# Patient Record
Sex: FEMALE | Race: WHITE | Employment: UNEMPLOYED | URBAN - METROPOLITAN AREA
[De-identification: names, ages, dates, MRNs, and addresses within clinical notes are randomized per-mention and may not be internally consistent; named-entity substitution may affect disease eponyms.]

---

## 2021-08-06 LAB
ANTIBODY SCREEN, EXTERNAL: NEGATIVE
CHLAMYDIA, EXTERNAL: NEGATIVE
HBSAG, EXTERNAL: NORMAL
HCT, EXTERNAL: 39.6
HEPATITIS C AB,   EXT: NORMAL
HGB, EXTERNAL: 12.6
HIV, EXTERNAL: NORMAL
N. GONORRHEA, EXTERNAL: NEGATIVE
PLATELET CNT,   EXTERNAL: 219
RUBELLA, EXTERNAL: NORMAL
T. PALLIDUM, EXTERNAL: NEGATIVE
TYPE, ABO & RH, EXTERNAL: NORMAL

## 2022-01-24 LAB — GRBS, EXTERNAL: NEGATIVE

## 2022-02-08 ENCOUNTER — TRANSCRIBE ORDER (OUTPATIENT)
Dept: REGISTRATION | Age: 32
End: 2022-02-08

## 2022-02-08 ENCOUNTER — HOSPITAL ENCOUNTER (OUTPATIENT)
Dept: PREADMISSION TESTING | Age: 32
Discharge: HOME | End: 2022-02-08
Attending: OBSTETRICS & GYNECOLOGY

## 2022-02-08 DIAGNOSIS — U07.1 COVID-19: ICD-10-CM

## 2022-02-08 DIAGNOSIS — U07.1 COVID-19: Primary | ICD-10-CM

## 2022-02-08 PROCEDURE — U0005 INFEC AGEN DETEC AMPLI PROBE: HCPCS

## 2022-02-09 LAB
SARS-COV-2, XPLCVT: NOT DETECTED
SOURCE, COVRS: NORMAL

## 2022-02-14 ENCOUNTER — HOSPITAL ENCOUNTER (INPATIENT)
Age: 32
LOS: 2 days | Discharge: HOME | End: 2022-02-16
Attending: OBSTETRICS & GYNECOLOGY | Admitting: OBSTETRICS & GYNECOLOGY
Payer: COMMERCIAL

## 2022-02-14 ENCOUNTER — ANESTHESIA (OUTPATIENT)
Dept: LABOR AND DELIVERY | Age: 32
End: 2022-02-14
Payer: COMMERCIAL

## 2022-02-14 ENCOUNTER — ANESTHESIA EVENT (OUTPATIENT)
Dept: LABOR AND DELIVERY | Age: 32
End: 2022-02-14
Payer: COMMERCIAL

## 2022-02-14 PROBLEM — Z3A.39 39 WEEKS GESTATION OF PREGNANCY: Status: ACTIVE | Noted: 2022-02-14

## 2022-02-14 LAB
A1 MICROGLOB PLACENTAL VAG QL: POSITIVE
CONTROL LINE PRESENT?: NORMAL
ERYTHROCYTE [DISTWIDTH] IN BLOOD BY AUTOMATED COUNT: 11.8 % (ref 11.5–14.5)
EXPIRATION DATE: NORMAL
HCT VFR BLD AUTO: 38.1 % (ref 35–47)
HGB BLD-MCNC: 12.6 G/DL (ref 11.5–16)
INTERNAL NEGATIVE CONTROL: NORMAL
KIT LOT NO.: NORMAL
MCH RBC QN AUTO: 31.7 PG (ref 26–34)
MCHC RBC AUTO-ENTMCNC: 33.1 G/DL (ref 30–36.5)
MCV RBC AUTO: 95.7 FL (ref 80–99)
NRBC # BLD: 0 K/UL (ref 0–0.01)
NRBC BLD-RTO: 0 PER 100 WBC
PLATELET # BLD AUTO: 244 K/UL (ref 150–400)
PMV BLD AUTO: 10.3 FL (ref 8.9–12.9)
RBC # BLD AUTO: 3.98 M/UL (ref 3.8–5.2)
WBC # BLD AUTO: 10.5 K/UL (ref 3.6–11)

## 2022-02-14 PROCEDURE — 74011250636 HC RX REV CODE- 250/636: Performed by: OBSTETRICS & GYNECOLOGY

## 2022-02-14 PROCEDURE — 84112 EVAL AMNIOTIC FLUID PROTEIN: CPT | Performed by: OBSTETRICS & GYNECOLOGY

## 2022-02-14 PROCEDURE — 74011000250 HC RX REV CODE- 250: Performed by: ANESTHESIOLOGY

## 2022-02-14 PROCEDURE — 74011250637 HC RX REV CODE- 250/637: Performed by: OBSTETRICS & GYNECOLOGY

## 2022-02-14 PROCEDURE — 0KQM0ZZ REPAIR PERINEUM MUSCLE, OPEN APPROACH: ICD-10-PCS | Performed by: OBSTETRICS & GYNECOLOGY

## 2022-02-14 PROCEDURE — 74011250636 HC RX REV CODE- 250/636: Performed by: MIDWIFE

## 2022-02-14 PROCEDURE — 85027 COMPLETE CBC AUTOMATED: CPT

## 2022-02-14 PROCEDURE — 36415 COLL VENOUS BLD VENIPUNCTURE: CPT

## 2022-02-14 PROCEDURE — 75410000003 HC RECOV DEL/VAG/CSECN EA 0.5 HR

## 2022-02-14 PROCEDURE — 4A1HXCZ MONITORING OF PRODUCTS OF CONCEPTION, CARDIAC RATE, EXTERNAL APPROACH: ICD-10-PCS | Performed by: OBSTETRICS & GYNECOLOGY

## 2022-02-14 PROCEDURE — 65410000002 HC RM PRIVATE OB

## 2022-02-14 PROCEDURE — 76060000078 HC EPIDURAL ANESTHESIA

## 2022-02-14 PROCEDURE — 75410000002 HC LABOR FEE PER 1 HR

## 2022-02-14 PROCEDURE — 99285 EMERGENCY DEPT VISIT HI MDM: CPT

## 2022-02-14 PROCEDURE — 75410000000 HC DELIVERY VAGINAL/SINGLE

## 2022-02-14 RX ORDER — SODIUM CHLORIDE 0.9 % (FLUSH) 0.9 %
5-40 SYRINGE (ML) INJECTION AS NEEDED
Status: DISCONTINUED | OUTPATIENT
Start: 2022-02-14 | End: 2022-02-16 | Stop reason: HOSPADM

## 2022-02-14 RX ORDER — OXYTOCIN/RINGER'S LACTATE 30/500 ML
10 PLASTIC BAG, INJECTION (ML) INTRAVENOUS AS NEEDED
Status: DISCONTINUED | OUTPATIENT
Start: 2022-02-14 | End: 2022-02-16 | Stop reason: HOSPADM

## 2022-02-14 RX ORDER — AMMONIA 15 % (W/V)
1 AMPUL (EA) INHALATION AS NEEDED
Status: DISCONTINUED | OUTPATIENT
Start: 2022-02-14 | End: 2022-02-16 | Stop reason: HOSPADM

## 2022-02-14 RX ORDER — ZINC GLUCONATE 10 MG
LOZENGE ORAL
COMMUNITY

## 2022-02-14 RX ORDER — FENTANYL/BUPIVACAINE/NS/PF 2-1250MCG
1-16 PREFILLED PUMP RESERVOIR EPIDURAL CONTINUOUS
Status: DISCONTINUED | OUTPATIENT
Start: 2022-02-14 | End: 2022-02-14 | Stop reason: HOSPADM

## 2022-02-14 RX ORDER — ONDANSETRON 2 MG/ML
4 INJECTION INTRAMUSCULAR; INTRAVENOUS
Status: DISCONTINUED | OUTPATIENT
Start: 2022-02-14 | End: 2022-02-16 | Stop reason: HOSPADM

## 2022-02-14 RX ORDER — EPHEDRINE SULFATE/0.9% NACL/PF 50 MG/5 ML
SYRINGE (ML) INTRAVENOUS
Status: DISCONTINUED
Start: 2022-02-14 | End: 2022-02-14 | Stop reason: WASHOUT

## 2022-02-14 RX ORDER — BUPIVACAINE HYDROCHLORIDE 5 MG/ML
30 INJECTION, SOLUTION EPIDURAL; INTRACAUDAL AS NEEDED
Status: DISCONTINUED | OUTPATIENT
Start: 2022-02-14 | End: 2022-02-14 | Stop reason: HOSPADM

## 2022-02-14 RX ORDER — OXYCODONE AND ACETAMINOPHEN 5; 325 MG/1; MG/1
1 TABLET ORAL
Status: DISCONTINUED | OUTPATIENT
Start: 2022-02-14 | End: 2022-02-16 | Stop reason: HOSPADM

## 2022-02-14 RX ORDER — IBUPROFEN 400 MG/1
800 TABLET ORAL EVERY 8 HOURS
Status: DISCONTINUED | OUTPATIENT
Start: 2022-02-14 | End: 2022-02-16 | Stop reason: HOSPADM

## 2022-02-14 RX ORDER — HYDROCORTISONE 1 %
CREAM (GRAM) TOPICAL AS NEEDED
Status: DISCONTINUED | OUTPATIENT
Start: 2022-02-14 | End: 2022-02-16 | Stop reason: HOSPADM

## 2022-02-14 RX ORDER — SODIUM CHLORIDE 0.9 % (FLUSH) 0.9 %
5-40 SYRINGE (ML) INJECTION EVERY 8 HOURS
Status: DISCONTINUED | OUTPATIENT
Start: 2022-02-14 | End: 2022-02-16 | Stop reason: HOSPADM

## 2022-02-14 RX ORDER — LANOLIN ALCOHOL/MO/W.PET/CERES
3 CREAM (GRAM) TOPICAL
Status: DISCONTINUED | OUTPATIENT
Start: 2022-02-14 | End: 2022-02-16 | Stop reason: HOSPADM

## 2022-02-14 RX ORDER — SODIUM CHLORIDE, SODIUM LACTATE, POTASSIUM CHLORIDE, CALCIUM CHLORIDE 600; 310; 30; 20 MG/100ML; MG/100ML; MG/100ML; MG/100ML
125 INJECTION, SOLUTION INTRAVENOUS CONTINUOUS
Status: DISCONTINUED | OUTPATIENT
Start: 2022-02-14 | End: 2022-02-16 | Stop reason: HOSPADM

## 2022-02-14 RX ORDER — EPHEDRINE SULFATE/0.9% NACL/PF 50 MG/5 ML
12.5 SYRINGE (ML) INTRAVENOUS
Status: DISCONTINUED | OUTPATIENT
Start: 2022-02-14 | End: 2022-02-14 | Stop reason: HOSPADM

## 2022-02-14 RX ORDER — VITAMIN A ACETATE, BETA CAROTENE, ASCORBIC ACID, CHOLECALCIFEROL, .ALPHA.-TOCOPHEROL ACETATE, DL-, THIAMINE MONONITRATE, RIBOFLAVIN, NIACINAMIDE, PYRIDOXINE HYDROCHLORIDE, FOLIC ACID, CYANOCOBALAMIN, CALCIUM CARBONATE, FERROUS FUMARATE, ZINC OXIDE, CUPRIC OXIDE 3080; 12; 120; 400; 1; 1.84; 3; 20; 22; 920; 25; 200; 27; 10; 2 [IU]/1; UG/1; MG/1; [IU]/1; MG/1; MG/1; MG/1; MG/1; MG/1; [IU]/1; MG/1; MG/1; MG/1; MG/1; MG/1
1 TABLET, FILM COATED ORAL DAILY
COMMUNITY

## 2022-02-14 RX ORDER — BUPIVACAINE HYDROCHLORIDE 5 MG/ML
INJECTION, SOLUTION EPIDURAL; INTRACAUDAL
Status: COMPLETED | OUTPATIENT
Start: 2022-02-14 | End: 2022-02-14

## 2022-02-14 RX ORDER — SIMETHICONE 80 MG
80 TABLET,CHEWABLE ORAL
Status: DISCONTINUED | OUTPATIENT
Start: 2022-02-14 | End: 2022-02-16 | Stop reason: HOSPADM

## 2022-02-14 RX ORDER — BUTORPHANOL TARTRATE 1 MG/ML
2 INJECTION INTRAMUSCULAR; INTRAVENOUS
Status: DISCONTINUED | OUTPATIENT
Start: 2022-02-14 | End: 2022-02-16 | Stop reason: HOSPADM

## 2022-02-14 RX ORDER — OXYTOCIN/RINGER'S LACTATE 30/500 ML
87.3 PLASTIC BAG, INJECTION (ML) INTRAVENOUS AS NEEDED
Status: DISCONTINUED | OUTPATIENT
Start: 2022-02-14 | End: 2022-02-16 | Stop reason: HOSPADM

## 2022-02-14 RX ORDER — NALOXONE HYDROCHLORIDE 0.4 MG/ML
0.4 INJECTION, SOLUTION INTRAMUSCULAR; INTRAVENOUS; SUBCUTANEOUS AS NEEDED
Status: DISCONTINUED | OUTPATIENT
Start: 2022-02-14 | End: 2022-02-14 | Stop reason: HOSPADM

## 2022-02-14 RX ORDER — TERBUTALINE SULFATE 1 MG/ML
0.25 INJECTION SUBCUTANEOUS AS NEEDED
Status: DISCONTINUED | OUTPATIENT
Start: 2022-02-14 | End: 2022-02-14 | Stop reason: HOSPADM

## 2022-02-14 RX ORDER — DIPHENHYDRAMINE HYDROCHLORIDE 50 MG/ML
12.5 INJECTION, SOLUTION INTRAMUSCULAR; INTRAVENOUS
Status: DISCONTINUED | OUTPATIENT
Start: 2022-02-14 | End: 2022-02-16 | Stop reason: HOSPADM

## 2022-02-14 RX ORDER — OXYTOCIN/RINGER'S LACTATE 30/500 ML
10 PLASTIC BAG, INJECTION (ML) INTRAVENOUS AS NEEDED
Status: COMPLETED | OUTPATIENT
Start: 2022-02-14 | End: 2022-02-14

## 2022-02-14 RX ORDER — DOCUSATE SODIUM 100 MG/1
100 CAPSULE, LIQUID FILLED ORAL
Status: DISCONTINUED | OUTPATIENT
Start: 2022-02-14 | End: 2022-02-15

## 2022-02-14 RX ORDER — HYDROCORTISONE ACETATE PRAMOXINE HCL 2.5; 1 G/100G; G/100G
CREAM TOPICAL AS NEEDED
Status: DISCONTINUED | OUTPATIENT
Start: 2022-02-14 | End: 2022-02-16 | Stop reason: HOSPADM

## 2022-02-14 RX ADMIN — Medication 12.5 MG: at 04:34

## 2022-02-14 RX ADMIN — OXYTOCIN 10000 MILLI-UNITS: 10 INJECTION INTRAVENOUS at 17:08

## 2022-02-14 RX ADMIN — BUPIVACAINE HYDROCHLORIDE 30 MG: 5 INJECTION, SOLUTION EPIDURAL; INTRACAUDAL; PERINEURAL at 08:23

## 2022-02-14 RX ADMIN — IBUPROFEN 800 MG: 400 TABLET, FILM COATED ORAL at 19:04

## 2022-02-14 RX ADMIN — SODIUM CHLORIDE, POTASSIUM CHLORIDE, SODIUM LACTATE AND CALCIUM CHLORIDE 500 ML: 600; 310; 30; 20 INJECTION, SOLUTION INTRAVENOUS at 02:39

## 2022-02-14 RX ADMIN — Medication 10 ML/HR: at 08:56

## 2022-02-14 RX ADMIN — BUTORPHANOL TARTRATE 2 MG: 1 INJECTION, SOLUTION INTRAMUSCULAR; INTRAVENOUS at 04:27

## 2022-02-14 RX ADMIN — SODIUM CHLORIDE, POTASSIUM CHLORIDE, SODIUM LACTATE AND CALCIUM CHLORIDE 125 ML/HR: 600; 310; 30; 20 INJECTION, SOLUTION INTRAVENOUS at 13:13

## 2022-02-14 NOTE — H&P
G1 @ 39+1 admitted to L&D with PROM. Was laying down for bed and felt a large gush of clear fluid with continued leaking. She may be having some cramping, but no contractions. Denies bleeding, and reports wnl FM.      PNC with Dr Jose Alejandro Pete unremarkable. Hx of kidney stones, and brother had DVT while on steroids but pt with no known thrombophilias.              Past Medical History   History reviewed. No pertinent past medical history.        Past Surgical History         Past Surgical History:   Procedure Laterality Date    HX WISDOM TEETH EXTRACTION                 History reviewed. No pertinent family history.     Social History      Tobacco Use    Smoking status: Never Smoker    Smokeless tobacco: Never Used   Vaping Use    Vaping Use: Never used   Substance and Sexual Activity    Alcohol use: Not Currently    Drug use: Never                  ALLERGIES: Patient has no known allergies.     Review of Systems   Constitutional: Negative. Negative for chills and fever. HENT: Negative. Eyes: Negative. Respiratory: Negative. Cardiovascular: Negative. Gastrointestinal: Negative. Negative for constipation, diarrhea, nausea and vomiting. Endocrine: Negative. Genitourinary: Negative. Negative for dysuria, hematuria and vaginal bleeding. Skin: Negative. Allergic/Immunologic: Negative. Neurological: Negative. Hematological: Negative. Psychiatric/Behavioral: Negative.              Vitals:     02/14/22 0037   BP: 124/78   Pulse: 64   Resp: 16   Temp: 98.4 °F (36.9 °C)   SpO2: 99%             Physical Exam  Constitutional:       Appearance: Normal appearance. HENT:      Head: Normocephalic. Cardiovascular:      Rate and Rhythm: Normal rate. Pulmonary:      Effort: Pulmonary effort is normal.   Abdominal:      Palpations: Abdomen is soft. Tenderness: There is no abdominal tenderness.    Genitourinary:     Comments: ULISES deferred, 2/70/-2 in the office, vertex by leopolds  Musculoskeletal:         General: Normal range of motion. Cervical back: Normal range of motion. Skin:     General: Skin is warm and dry. Neurological:      General: No focal deficit present. Mental Status: She is alert and oriented to person, place, and time. Psychiatric:         Mood and Affect: Mood normal.         Behavior: Behavior normal.       EFM: 130, moderate variability, accels present, no decels                     G1 @ 39w with PROM at x 2 hours, clear  1. Admit to L&D  2. Consider augmentation at 0600 if not in labor  3. GBS neg  4.  Covid neg

## 2022-02-14 NOTE — ANESTHESIA PROCEDURE NOTES
Epidural Block    Patient location during procedure: OB  Start time: 2/14/2022 8:23 AM  End time: 2/14/2022 8:34 AM  Reason for block: labor epidural  Staffing  Performed: attending   Anesthesiologist: Cisco Low MD  Preanesthetic Checklist  Completed: patient identified, IV checked, site marked, risks and benefits discussed, surgical consent, monitors and equipment checked, pre-op evaluation and timeout performed  Block Placement  Patient position: sitting  Prep: ChloraPrep  Sterility prep: cap, drape, gloves and mask  Sedation level: no sedation  Patient monitoring: continuous pulse oximetry and heart rate  Approach: midline  Location: lumbar  Lumbar location: L4-L5  Epidural  Loss of resistance technique: saline  Guidance: landmark technique  Needle  Needle type: Tuohy   Needle gauge: 17 G  Needle length: 9 cm  Needle insertion depth: 7 cm  Catheter type: end hole  Catheter size: 19 G  Catheter at skin depth: 12 cm  Catheter securement method: clear occlusive dressing, liquid medical adhesive and surgical tape  Medications Administered  Bupivacaine (PF) (MARCAINE) 0.5 % (5 mg/mL) Epidural, 30 mg  Assessment  Sensory level: T6  Block outcome: pain improved  Number of attempts: 1  Procedure assessment: patient tolerated procedure well with no immediate complications

## 2022-02-14 NOTE — PROGRESS NOTES
Labor Progress Note  Patient seen, fetal heart rate and contraction pattern evaluated. Comfortable w epidural    VSS, pitocin @ NA  Fetal Heart Rate: moderate variability  Accelerations: yes  Decelerations: none  Uterine Activity: q3min  Cervical Exam: 6/90/-1      Assessment/Plan:  Reassuring fetal status. Will continue current management.

## 2022-02-14 NOTE — PROGRESS NOTES
Labor Progress Note  Patient seen, fetal heart rate and contraction pattern evaluated. VSS, pitocin @ NA  Fetal Heart Rate: moderate variability  Accelerations: yes  Decelerations: variable  Uterine Activity: Irregular    Assessment/Plan:  Reassuring fetal status. Patient pushing w excellent effort.

## 2022-02-14 NOTE — PROGRESS NOTES
Labor Progress Note  Patient seen, fetal heart rate and contraction pattern evaluated. Comfortable w epidural.    VSS, pitocin @ NA  Fetal Heart Rate: moderate variability  Accelerations: yes  Decelerations: variable  Uterine Activity: Irregular  Cervical Exam: FD/0      Assessment/Plan:  Reassuring fetal status. Will continue current management.   Baby asynclitic, will do position changes, labor down, then start pushing

## 2022-02-14 NOTE — ED PROVIDER NOTES
G1 @ 39+1 arrives to [facility] with c/o ROM. Was laying down for bed and felt a large gush of clear fluid with continued leaking. She may be having some cramping, but no contractions. Denies bleeding, and reports wnl FM. PNC with Dr David Crook unremarkable. Hx of kidney stones, and brother had DVT while on steroids but pt with no known thrombophilias. History reviewed. No pertinent past medical history. Past Surgical History:   Procedure Laterality Date    HX WISDOM TEETH EXTRACTION           History reviewed. No pertinent family history. Social History     Socioeconomic History    Marital status: UNKNOWN     Spouse name: Not on file    Number of children: Not on file    Years of education: Not on file    Highest education level: Not on file   Occupational History    Not on file   Tobacco Use    Smoking status: Never Smoker    Smokeless tobacco: Never Used   Vaping Use    Vaping Use: Never used   Substance and Sexual Activity    Alcohol use: Not Currently    Drug use: Never    Sexual activity: Not on file   Other Topics Concern     Service No    Blood Transfusions No    Caffeine Concern No    Occupational Exposure No    Hobby Hazards No    Sleep Concern No    Stress Concern No    Weight Concern No    Special Diet No    Back Care No    Exercise No    Bike Helmet No    Seat Belt No    Self-Exams No   Social History Narrative    Not on file     Social Determinants of Health     Financial Resource Strain:     Difficulty of Paying Living Expenses: Not on file   Food Insecurity:     Worried About Running Out of Food in the Last Year: Not on file    Gregg of Food in the Last Year: Not on file   Transportation Needs:     Lack of Transportation (Medical): Not on file    Lack of Transportation (Non-Medical):  Not on file   Physical Activity:     Days of Exercise per Week: Not on file    Minutes of Exercise per Session: Not on file   Stress:     Feeling of Stress : Not on file   Social Connections:     Frequency of Communication with Friends and Family: Not on file    Frequency of Social Gatherings with Friends and Family: Not on file    Attends [employer] Services: Not on file    Active Member of Clubs or Organizations: Not on file    Attends Club or Organization Meetings: Not on file    Marital Status: Not on file   Intimate Partner Violence:     Fear of Current or Ex-Partner: Not on file    Emotionally Abused: Not on file    Physically Abused: Not on file    Sexually Abused: Not on file   Housing Stability:     Unable to Pay for Housing in the Last Year: Not on file    Number of [place] in the Last Year: Not on file    Unstable Housing in the Last Year: Not on file         ALLERGIES: Patient has no known allergies. Review of Systems   Constitutional: Negative. Negative for chills and fever. HENT: Negative. Eyes: Negative. Respiratory: Negative. Cardiovascular: Negative. Gastrointestinal: Negative. Negative for constipation, diarrhea, nausea and vomiting. Endocrine: Negative. Genitourinary: Negative. Negative for dysuria, hematuria and vaginal bleeding. Skin: Negative. Allergic/Immunologic: Negative. Neurological: Negative. Hematological: Negative. Psychiatric/Behavioral: Negative. Vitals:    02/14/22 0037   BP: 124/78   Pulse: 64   Resp: 16   Temp: 98.4 °F (36.9 °C)   SpO2: 99%            Physical Exam  Constitutional:       Appearance: Normal appearance. HENT:      Head: Normocephalic. Cardiovascular:      Rate and Rhythm: Normal rate. Pulmonary:      Effort: Pulmonary effort is normal.   Abdominal:      Palpations: Abdomen is soft. Tenderness: There is no abdominal tenderness. Genitourinary:     Comments: ULISES deferred, 2/70/-2 in the office, vertex by leopolds  Musculoskeletal:         General: Normal range of motion. Cervical back: Normal range of motion. Skin:     General: Skin is warm and dry. Neurological:      General: No focal deficit present. Mental Status: She is alert and oriented to person, place, and time. Psychiatric:         Mood and Affect: Mood normal.         Behavior: Behavior normal.      EFM: 130, moderate variability, accels present, no decels    MDM  Number of Diagnoses or Management Options  Risk of Complications, Morbidity, and/or Mortality  Presenting problems: moderate  Diagnostic procedures: moderate  Management options: moderate    Critical Care  Total time providing critical care: 30-74 minutes    Patient Progress  Patient progress: stable    ED Course as of 02/14/22 0055   Mon Feb 14, 2022   0046 G1 @ 39w with PROM at 2245  1. Admit to L&D  2. Consider augmentation at 0600 if not in labor  3. GBS neg  4.  Covid neg [EF]      ED Course User Index  [EF] Kaity Brown CNM       Procedures

## 2022-02-14 NOTE — L&D DELIVERY NOTE
Delivery Summary  Patient: Ramiro Wolff             Circumcision:   NA-female  Additional Delivery Comments - pt pushed to deliver head OA, no nuchal cord, shoulders and rest of body followed quickly and without difficulty. Baby to mom's chest, short cord, delayed clamping, then cut by patient. Placenta spont, intact, 3VC. Small 2nd degree lac repaired w 3-0 vicryl in standard fashion.       Information for the patient's :  Everardo Leigh [[id number]]     Delivery Type: Vaginal, Spontaneous   Delivery Date: 2022   Delivery Time: 5:04 PM     Birth Weight:       Sex:  female  Delivery Clinician:  Leela Jean-Baptiste   Gestational Age: 36w3d    Presentation: Vertex   Position:    Occiput  Anterior     Apgars were   and       Resuscitation Method: Suctioning-bulb; Tactile Stimulation     Meconium Stained: None    Living Status:         Placenta Date/Time: 2022  5:08 PM   Placenta Removal: Spontaneous   Placenta Appearance: Normal    Cord Information: 3 Vessels    Cord Events: None       Disposition of Cord Blood: Lab    Blood Gases Sent?:  No       Cord pH:  none    Episiotomy: None   Laceration(s): 2nd     Estimated Blood Loss (ml): No data found    Labor Events  Method: None      Augmentation: None   Cervical Ripening:     None        Operative Vaginal Delivery - none

## 2022-02-14 NOTE — PROGRESS NOTES
0109 - patient admitted to L&D room 9.  0340 - patient complaining of more intense contractions, back pain and pelvic pressure.    4183 Tammy Webster at bedside to assess, SVE 4/90/-2, orders received for Stadol and phenergan

## 2022-02-14 NOTE — ROUTINE PROCESS
1130: Bedside shift change report given to [facility] (oncoming nurse) by Sergio Courtney RN (offgoing nurse). Report included the following information SBAR, Intake/Output, MAR and Recent Results. 1205: exaggerated runners L side. 1310: exaggerated runners R side. 1331: Dr Gianluca Dinh at bedside, SVE 10/100/0. Continue position changes and allow patient to labor down. 1335: exaggerated runners L side. 1405: spinning babies L side. 1415: spinning babies R side. 1425: closed knee with peanut ball between ankles. 1457: start pushing. 1530: RN at bedside, continuously monitoring FHR tracing    1600: RN at bedside, continuously monitoring FHR tracing    1630: RN at bedside, continuously monitoring FHR tracing    1700: RN at bedside, continuously monitoring FHR tracing    1704:  of viable female infant by Dr Gianluca Dinh.

## 2022-02-14 NOTE — PROGRESS NOTES
Labor Progress Note    Pt feeling nauseous, pelvic pain, and pressure. Requests cervical exam.     Patient seen, fetal heart rate and contraction pattern evaluated. Physical Exam:  Cervical Exam: 4/90/-2  Membranes: SROM clear x 5 hours  Uterine Activity: q 2-3 min  Fetal Heart Rate: 130, moderate variability, accels present, no decels    Assessment/Plan:  Reassuring fetal status. Pt requests pain medication.              Matheus Shaw CNM

## 2022-02-14 NOTE — PROGRESS NOTES
0730 - Bedside shift change report given to Shaina Eubanks RN (oncoming nurse) by Chelsy Streeter RN (offgoing nurse). Report included the following information SBAR.  of [place] here for SROM at home last night. On birthing ball and feeling nauseous    0740 - Patient vomiting    0800 - Patient requesting epidural. IV bolus initiated. 7396 - Patient up to bathroom    0820 - Dr Em Cash at bedside to place epidural    0830 - Patient on left side, epidural placed    0840 - Patient on right side, peanut ball placed, FHR monitor adjusted    0920 - FHR monitor adjusted    0930 - Dr Neelam Mckeon at bedside. SVE 6/90/-1.     1000 - RN at bedside, Turn patient on left side, peanut ball placed    1015 - RN at bedside, FHR monitors adjusted    1105 - RN at bedside, Straight Cath patient, 400mL, turn on right side with peanut ball in place    1118 - IV bolus    1130 - Bedside shift change report given to [facility] [place] (oncoming nurse) by Shaina Eubanks RN (offgoing nurse). Report included the following information SBAR.

## 2022-02-15 PROCEDURE — 74011250637 HC RX REV CODE- 250/637: Performed by: OBSTETRICS & GYNECOLOGY

## 2022-02-15 PROCEDURE — 2709999900 HC NON-CHARGEABLE SUPPLY

## 2022-02-15 PROCEDURE — 65410000002 HC RM PRIVATE OB

## 2022-02-15 RX ORDER — IBUPROFEN 800 MG/1
800 TABLET ORAL
Qty: 40 TABLET | Refills: 1 | Status: SHIPPED | OUTPATIENT
Start: 2022-02-15

## 2022-02-15 RX ORDER — DOCUSATE SODIUM 100 MG/1
100 CAPSULE, LIQUID FILLED ORAL
Status: DISCONTINUED | OUTPATIENT
Start: 2022-02-15 | End: 2022-02-16 | Stop reason: HOSPADM

## 2022-02-15 RX ADMIN — DOCUSATE SODIUM 100 MG: 100 CAPSULE, LIQUID FILLED ORAL at 16:30

## 2022-02-15 RX ADMIN — IBUPROFEN 800 MG: 400 TABLET, FILM COATED ORAL at 12:19

## 2022-02-15 RX ADMIN — IBUPROFEN 800 MG: 400 TABLET, FILM COATED ORAL at 20:28

## 2022-02-15 RX ADMIN — IBUPROFEN 800 MG: 400 TABLET, FILM COATED ORAL at 02:45

## 2022-02-15 RX ADMIN — DOCUSATE SODIUM 100 MG: 100 CAPSULE, LIQUID FILLED ORAL at 02:45

## 2022-02-15 NOTE — PROGRESS NOTES
Post-Partum Day Number 1 Progress Note    Dakota Abdul     Assessment: Doing well, post partum day 1    Plan:  - Continue routine postpartum and perineal care as well as maternal education.  - Plan discharge home [id number] [address] Discharge Date: Tomorrow. AM d/c in. Information for the patient's :  Peter Nurse [[id number]]   Vaginal, Spontaneous    Patient doing well without significant complaint. Voiding without difficulty, normal lochia. Vitals:  Visit Vitals  /64 (BP 1 Location: Left arm, BP Patient Position: At rest)   Pulse 74   Temp 98.6 °F (37 °C)   Resp 16   Ht 5' 3\" (1.6 m)   Wt 72.1 kg (159 lb)   SpO2 98%   Breastfeeding Unknown   BMI 28.17 kg/m²     Temp (24hrs), Av.2 °F (36.8 °C), Min:97.6 °F (36.4 °C), Max:98.8 °F (37.1 °C)        Exam:   Patient without distress. Fundus firm, nontender per nursing fundal checks. Perineum with normal lochia noted per nursing assessment. Lower extremities are negative for pathological edema. Labs:     Lab Results   Component Value Date/Time    WBC 10.5 2022 02:29 AM    HGB 12.6 2022 02:29 AM    HCT 38.1 2022 02:29 AM    PLATELET 748  02:29 AM    Hgb, External 12.6 2021 12:00 AM    Hct, External 39.6 2021 12:00 AM    Platelet cnt., External 219 2021 12:00 AM       No results found for this or any previous visit (from the past 24 hour(s)).

## 2022-02-15 NOTE — LACTATION NOTE
This note was copied from a baby's chart. Initial Lactation Consultation: Infant born vaginally last evening to a  mom at 44 1/7 weeks gestation. Mom noted breast changes during the pregnancy and has easily expressed colostrum. Assisted mom with latching infant in the football and cross cradle positions. Mom has flattish nipples which are tender and reddened. Hermilo Fallow Graham's Cream provided for nipple care and nipple shells given to wear between feeds. Infant latched during my visit, directly to the breast. Mom states it was tender, but infant appeared to be latched deeply and nipples were rounded upon release. Tenderness likely from previous poor latches. Feeding Plan: Mother will keep baby skin to skin as often as possible, feed on demand, 8-12x/day , respond to feeding cues, obtain latch, listen for audible swallowing, be aware of signs of oxytocin release/ cramping,thirst,sleepiness while breastfeeding, offer both breasts,and will not limit feedings. Mother agrees to utilize breast massage while nursing to facilitate lactogenesis.

## 2022-02-15 NOTE — DISCHARGE SUMMARY
Obstetrical Discharge Summary     Name: Sonia Church MRN: [id number]  SSN: [id number]    YOB: 1990  Age: 28 y.o. Sex: female      Admit Date: 2022    Discharge Date: 2022     Admitting Physician: Sonido Pino MD     Attending Physician:  Paz Chisholm, Josh Calhoun,*     Admission Diagnoses: 39 weeks gestation of pregnancy [Z3A.39]    Discharge Diagnoses:   Information for the patient's :  Parminder Torres [[id number]]   Delivery of a 2.94 kg female infant via Vaginal, Spontaneous on 2022 at 5:04 PM  by Chase Ladd. Apgars were 9  and 9 . Additional Diagnoses:   Hospital Problems  Never Reviewed          Codes Class Noted POA    Normal labor and delivery ICD-10-CM: O80  ICD-9-CM: 875  2/15/2022 Unknown        39 weeks gestation of pregnancy ICD-10-CM: Z3A.39  ICD-9-CM: V22.2  2022 Unknown             Lab Results   Component Value Date/Time    Rubella, External immune 2021 12:00 AM    GrBStrep, External negative 2022 12:00 AM       Hospital Course: Normal hospital course following the delivery. Patient Instructions:   Current Discharge Medication List      START taking these medications    Details   ibuprofen (MOTRIN) 800 mg tablet Take 1 Tablet by mouth every eight (8) hours as needed for Pain. Qty: 40 Tablet, Refills: 1         CONTINUE these medications which have NOT CHANGED    Details   prenatal vit-calcium-iron-fa (Prenatal Plus, calcium carb,) 27 mg iron- 1 mg tab Take 1 Tablet by mouth daily. magnesium 250 mg tab Take  by mouth. Disposition at Discharge: Home or self care    Condition at Discharge: Stable    Reference my discharge instructions.     Follow-up Appointments   Procedures    FOLLOW UP VISIT Appointment in: 6 Weeks     Standing Status:   Standing     Number of Occurrences:   1     Order Specific Question:   Appointment in     Answer:   6 Weeks        Signed By:  Roberto Toney MD     February 15, 2022

## 2022-02-15 NOTE — ROUTINE PROCESS
1930 Received OB SBAR Report from Tano Damian RN, patient ready to be wheeled out recovery complete, fundus U-1 minimal bleeding noted    2015 TRANSFER - OUT REPORT:    Verbal report given to ERICKA Montanez RN(name) on Darnell Lam  being transferred to [facility]U (unit) for routine progression of care       Report consisted of patients Situation, Background, Assessment and   Recommendations(SBAR). Information from the following report(s) SBAR was reviewed with the receiving nurse. Lines:   Peripheral IV 02/14/22 Anterior; Left Forearm (Active)   Site Assessment Clean, dry, & intact 02/14/22 1200   Phlebitis Assessment 0 02/14/22 1200   Infiltration Assessment 0 02/14/22 1200   Dressing Status Clean, dry, & intact 02/14/22 1200   Dressing Type Tape;Transparent 02/14/22 1200   Hub Color/Line Status Infusing 02/14/22 1200        Opportunity for questions and clarification was provided.       Patient transported with:   Registered Nurse     Fundal check with Rose Mendoza RN fundus firm U-1, minimal bleeding noted

## 2022-02-15 NOTE — ROUTINE PROCESS
TRANSFER - IN REPORT:    Verbal report received from Cricket Mcardle RN (name) on Remus Fess  being received from L & D (unit) for routine progression of care      Report consisted of patients Situation, Background, Assessment and   Recommendations(SBAR). Information from the following report(s) SBAR and Kardex was reviewed with the receiving nurse. Opportunity for questions and clarification was provided. Assessment completed upon patients arrival to unit and care assumed.

## 2022-02-16 VITALS
TEMPERATURE: 97.7 F | OXYGEN SATURATION: 97 % | RESPIRATION RATE: 16 BRPM | SYSTOLIC BLOOD PRESSURE: 116 MMHG | HEART RATE: 74 BPM | BODY MASS INDEX: 28.17 KG/M2 | WEIGHT: 159 LBS | DIASTOLIC BLOOD PRESSURE: 72 MMHG | HEIGHT: 63 IN

## 2022-02-16 PROCEDURE — 74011250637 HC RX REV CODE- 250/637: Performed by: OBSTETRICS & GYNECOLOGY

## 2022-02-16 RX ADMIN — IBUPROFEN 800 MG: 400 TABLET, FILM COATED ORAL at 04:29

## 2022-02-16 RX ADMIN — DOCUSATE SODIUM 100 MG: 100 CAPSULE, LIQUID FILLED ORAL at 08:40

## 2022-02-16 NOTE — ROUTINE PROCESS
Bedside shift change report given to ERICKA Cruz RN (oncoming nurse) by Bonnie Sharif RN (offgoing nurse). Report included the following information SBAR, Intake/Output and MAR.

## 2022-02-16 NOTE — DISCHARGE INSTRUCTIONS
Patient Education        After Your Delivery (the Postpartum Period): Care Instructions  Your Care Instructions     Congratulations on the birth of your baby. Like pregnancy, the  period can be a time of excitement, nirmal, and exhaustion. You may look at your wondrous little baby and feel happy. You may also be overwhelmed by your new sleep hours and new responsibilities. At first, babies often sleep during the days and are awake at night. They do not have a pattern or routine. They may make sudden gasps, jerk themselves awake, or look like they have crossed eyes. These are all normal, and they may even make you smile. In these first weeks after delivery, try to take good care of yourself. It may take 4 to 6 weeks to feel like yourself again, and possibly longer if you had a  birth. You will likely feel very tired for several weeks. Your days will be full of ups and downs, but lots of nirmal as well. Follow-up care is a key part of your treatment and safety. Be sure to make and go to all appointments, and call your doctor if you are having problems. It's also a good idea to know your test results and keep a list of the medicines you take. How can you care for yourself at home? Take care of your body after delivery  · Use pads instead of tampons for the bloody flow that may last as long as 2 weeks. · Ease cramps with ibuprofen (Advil, Motrin). · Ease soreness of hemorrhoids and the area between your vagina and rectum with ice compresses or witch hazel pads. · Ease constipation by drinking lots of fluid and eating high-fiber foods. Ask your doctor about over-the-counter stool softeners. · Cleanse yourself with a gentle squeeze of warm water from a bottle instead of wiping with toilet paper. · Take a sitz bath in warm water several times a day. · Wear a good nursing bra. Ease sore and swollen breasts with warm, wet washcloths.   · If you aren't breastfeeding, use ice rather than heat for breast soreness. · Your period may not start for several months if you are breastfeeding. You may bleed more, and longer at first, than you did before you got pregnant. · Wait until you are healed (about 4 to 6 weeks) before you have sex. Ask your doctor when it is okay for you to have sex. · Try not to travel with your baby for 5 or 6 weeks. If you take a long car trip, make frequent stops to walk around and stretch. Avoid exhaustion  · Rest every day. Try to nap when your baby naps. · Ask another adult to be with you for a few days after delivery. · Plan for  if you have other children. · Stay flexible so you can eat at odd hours and sleep when you need to. Both you and your baby are making new schedules. · Plan small trips to get out of the house. Change can make you feel less tired. · Ask for help with housework, cooking, and shopping. Remind yourself that your job is to care for your baby. Know about help for postpartum depression  · \"Baby blues\" are common for the first 1 to 2 weeks after birth. You may cry or feel sad or irritable for no reason. · Rest whenever you can. Being tired makes it harder to handle your emotions. · Go for walks with your baby. · Talk to your partner, friends, and family about your feelings. · If your symptoms last for more than a few weeks, or if you feel very depressed, ask your doctor for help. · Postpartum depression can be treated. Support groups and counseling can help. Sometimes medicine can also help. Stay healthy  · Eat healthy foods so you have more energy. · If you breastfeed, avoid drugs. If you quit smoking during pregnancy, try to stay smoke-free. If you choose to have a drink now and then, have only one drink, and limit the number of occasions that you have a drink. Wait to breastfeed at least 2 hours after you have a drink to reduce the amount of alcohol the baby may get in the milk.   · Start daily exercise after 4 to 6 weeks, but rest when you feel tired.  · Learn exercises to tone your belly. Do Kegel exercises to regain strength in your pelvic muscles. You can do these exercises while you stand or sit. ? Squeeze the same muscles you would use to stop your urine. Your belly and thighs should not move. ? Hold the squeeze for 3 seconds, and then relax for 3 seconds. ? Start with 3 seconds. Then add 1 second each week until you are able to squeeze for 10 seconds. ? Repeat the exercise 10 to 15 times for each session. Do three or more sessions each day. · Find a class for you and your baby that has an exercise time. · If you had a  birth, give yourself a bit more time before you exercise, and be careful. When should you call for help? Call 911  anytime you think you may need emergency care. For example, call if:    · You have thoughts of harming yourself, your baby, or another person.     · You passed out (lost consciousness).     · You have chest pain, are short of breath, or cough up blood.     · You have a seizure. Call your doctor now or seek immediate medical care if:    · You have severe vaginal bleeding. This means you are passing blood clots and soaking through a pad each hour for 2 or more hours.     · You are dizzy or lightheaded, or you feel like you may faint.     · You have a fever.     · You have new or more belly pain.     · You have signs of a blood clot in your leg (called a deep vein thrombosis), such as:  ? Pain in the calf, back of the knee, thigh, or groin. ? Redness and swelling in your leg or groin.     · You have signs of preeclampsia, such as:  ? Sudden swelling of your face, hands, or feet. ? New vision problems (such as dimness, blurring, or seeing spots). ? A severe headache.    Watch closely for changes in your health, and be sure to contact your doctor if:    · Your vaginal bleeding seems to be getting heavier.     · You have new or worse vaginal discharge.     · You feel sad, anxious, or hopeless for more than a few days.     · You do not get better as expected. Where can you learn more? Go to http://www.[employer].com/  Enter A461 in the search box to learn more about \"After Your Delivery (the Postpartum Period): Care Instructions. \"  Current as of: June 16, 2021               Content Version: 13.0  © 2006-2021 [employer]. Care instructions adapted under license by [employer] (which disclaims liability or warranty for this information). If you have questions about a medical condition or this instruction, always ask your healthcare professional. [place] [address] any warranty or liability for your use of this information. Postpartum Support Groups (virtual)  We know that all of us are dealing with a tremendous amount of uncertainty, confusion and disruption to our daily lives, which may result in increased anxiety, depression and fear. If you are feeling unsettled or worse, please know that we are here to help. During this time of increased caution and care for one another, [facility] [place] ([address] [place] [address]) is offering virtual support groups to ALL MOTHERS in [place] regardless of the age of your child/children as a way to help weather this emotional storm together. Social support is an important part of self-care during this time of physical distancing. Virtual postpartum support group meetings available at www. postpartumva.org  Warm Line: 149.992.5493    Breastfeeding Support Groups (virtual)  1st and 3rd Wednesday of each month  2nd and 4th Tuesday of each month    [place] at www.[employer] under the \"About Us\" and \"Classes and Events tabs\"    POST DELIVERY DISCHARGE INSTRUCTIONS    Name: Leandro Duffy  YOB: 1990  Primary Diagnosis: Active Problems:    39 weeks gestation of pregnancy (2/14/2022)      Normal labor and delivery (2/15/2022)        General:     Diet/Diet Restrictions:  · Eight 8-ounce glasses of fluid daily (water, juices); avoid excessive caffeine intake. · Meals/snacks as desired which are high in fiber and carbohydrates and low in fat and cholesterol. Physical Activity / Restrictions / Safety:     · Avoid heavy lifting, no more that 8 lbs. For 2-3 weeks;   · Limit use of stairs to 2 times daily for the first week home. · No driving for one week. · Avoid intercourse 4-6 weeks, no douching or tampon use. · Check with obstetrician before starting or resuming an exercise program.      Discharge Instructions/Special Treatment/Home Care Needs:     · Continue prenatal vitamins. · Continue to use squirt bottle with warm water on your episiotomy after each bathroom use until bleeding stops. · If steri-strips applied to your incision, remove in 7-10 days. Call your doctor for the following:     · Fever over 101 degrees by mouth. · Vaginal bleeding heavier than a normal menstrual period or clots larger than a golf ball. · Red streaks or increased swelling of legs, painful red streaks on your breast.  · Painful urination, constipation and increased pain or swelling or discharge with your incision. · If you feel extremely anxious or overwhelmed. · If you have thoughts of harming yourself and/or your baby. Pain Management:     · Take Acetaminophen (Tylenol) or Ibuprofen (Advil, Motrin), as directed for pain. · Use a warm Sitz bath 3 times daily to relieve episiotomy or hemorrhoidal discomfort. · For hemorrhoidal discomfort, use Tucks and Anusol cream as needed and directed. · Heating pad to  incision as needed.      Follow-Up Care:     Appointment with MD:   Follow-up Appointments   Procedures    FOLLOW UP VISIT Appointment in: 6 Weeks     Standing Status:   Standing     Number of Occurrences:   1     Order Specific Question:   Appointment in     Answer:   Lina Paige     Telephone number: [phone]    Signed By: Mac Alegre MD Date: 2/15/2022 Time: 9:55 AM

## 2022-02-16 NOTE — LACTATION NOTE
This note was copied from a baby's chart. Baby nursing well today,  deep latch obtained, mother is comfortable, baby feeding vigorously with rhythmic suck, swallow, breathe pattern, audible swallowing, and evident milk transfer, both breasts offered using nipple shield, baby is asleep following feeding. Weight loss: 5.9%    Breasts may become engorged when milk \"comes in\". How milk is made / normal phases of milk production, supply and demand discussed. Taught care of engorged breasts - frequent breastfeeding encouraged and breast massage ac. Then nurse the baby (or pump minimally for comfort). Apply cold compresses ac and/or pc x 15 minutes a few times a day for swelling or discomfort. May need to do this care for a couple of days. Discussed prevention and treatment of mastitis.

## 2022-02-16 NOTE — ROUTINE PROCESS
2000- Bedside shift change report given to [facility] RN (oncoming nurse) by Yana Jacobo RN (offgoing nurse). Report included the following information SBAR.

## 2022-02-16 NOTE — PROGRESS NOTES
Post-Partum Day Number 2 Progress Note    Diane Beckett     Assessment: Doing well, post partum day 2    Plan:   - Discharge home today  - Follow up in office in 6 week(s) with [facility].  - Pain medication prescription(s) sent. - Questions answered. Information for the patient's :  Annamary Loges [[id number]]   Vaginal, Spontaneous    Patient doing well without significant complaint. Voiding without difficulty, normal lochia. Ready for discharge home. Vitals:  Visit Vitals  /68 (BP 1 Location: Right upper arm, BP Patient Position: At rest)   Pulse 62   Temp 97.7 °F (36.5 °C)   Resp 16   Ht 5' 3\" (1.6 m)   Wt 72.1 kg (159 lb)   SpO2 96%   Breastfeeding Unknown   BMI 28.17 kg/m²     Temp (24hrs), Av.9 °F (36.6 °C), Min:97.5 °F (36.4 °C), Max:98.6 °F (37 °C)      Exam:        Patient without distress. Fundus firm, nontender per nursing fundal checks                Perineum with normal lochia noted per nursing assessment                Lower extremities are negative for pathological edema    Labs:     Lab Results   Component Value Date/Time    WBC 10.5 2022 02:29 AM    HGB 12.6 2022 02:29 AM    HCT 38.1 2022 02:29 AM    PLATELET 100  02:29 AM    Hgb, External 12.6 2021 12:00 AM    Hct, External 39.6 2021 12:00 AM    Platelet cnt., External 219 2021 12:00 AM       No results found for this or any previous visit (from the past 24 hour(s)).

## 2022-02-17 NOTE — ANESTHESIA POSTPROCEDURE EVALUATION
* No procedures listed *.    epidural    Anesthesia Post Evaluation        Patient location during evaluation: PACU  Patient participation: complete - patient participated  Level of consciousness: awake and alert  Pain management: adequate  Airway patency: patent  Anesthetic complications: no  Cardiovascular status: acceptable  Respiratory status: acceptable  Hydration status: acceptable  Comments: I have seen and evaluated the patient and is ready for discharge. Avelino Clayton MD    Post anesthesia nausea and vomiting:  none      INITIAL Post-op Vital signs: No vitals data found for the desired time range.

## 2022-03-19 PROBLEM — Z3A.39 39 WEEKS GESTATION OF PREGNANCY: Status: ACTIVE | Noted: 2022-02-14

## 2023-05-14 RX ORDER — IBUPROFEN 800 MG/1
800 TABLET ORAL EVERY 8 HOURS PRN
COMMUNITY
Start: 2022-02-15